# Patient Record
(demographics unavailable — no encounter records)

---

## 2025-05-09 NOTE — REASON FOR VISIT
[Follow-Up Evaluation] : a follow-up evaluation for [Cerebral Palsy] : cerebral palsy [Parents] : parents [Mother] : mother

## 2025-05-09 NOTE — PHYSICAL EXAM
[Normocephalic] : normocephalic [No dysmorphic facial features] : no dysmorphic facial features [Heart sounds regular in rate and rhythm] : heart sounds regular in rate and rhythm [Well related, good eye contact] : well related, good eye contact [Pupils reactive to light and accommodation] : pupils reactive to light and accommodation [Full extraocular movements] : full extraocular movements [Saccadic and smooth pursuits intact] : saccadic and smooth pursuits intact [No nystagmus] : no nystagmus [No facial asymmetry or weakness] : no facial asymmetry or weakness [de-identified] : abnormal curvature of the thoracic spine [de-identified] : nonverbal [de-identified] : hypotonic [de-identified] : unable to assess [de-identified] : 4 beats of ankle clonus bilaterally [de-identified] : truncal and central ataxia, very tremelous

## 2025-05-09 NOTE — PLAN
[FreeTextEntry1] : Child was referred to the ED in [facility]. I discussed his case with Dr. Seth the neurologist in site \ F/U following his hospital discharge

## 2025-05-09 NOTE — HISTORY OF PRESENT ILLNESS
[FreeTextEntry1] : Kelton is a 7 year old male with CP presenting after loss of follow up being evaluated for tremors in his legs.  The tremors come and go but has become more noticeable according to mother.  Happens only for a few seconds at a time but occurs multiple times per day.  PT at school has noticed it when he stands up for exercise.  Mother has noticed in the morning that he won't put much weight on his feet in the morning.  Patient does not normally walk.  Mother notices that he starts to get little twitches/spasms in his back.  Does not seem to cause him distress or pain as he is smiling when these episodes occur.  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    Mother denies staring spells, tonic clonic/stiffening movements.  Denies family history of anyone with tremors.  No early/premature deaths in the family.    Mother states that he is only on multivitamin and no other medications.  Developmentally, he can sit up straight but does not walk.  Does not talk.  Does have a social smile and does track objects.  Mother states he's no longer able to hold his bottle.  Receives PT/OT/Speech in school.    3/28/2024 with [facility]. She reported observing an episode of stiffening on 3/25 followed by sleep. After talking to the school staff they reported observing stiffening episodes in the past. His condition otherwise is table   3/30/2024  With [facility] to discuss the REEG results. The EEG showed bilateral background slowing, and two 4 and 7 second periods of voltage attenuation which may be c/w a seizure   5/10/2024  with the MOTHER. On Lacosamide 10mg/ML, 6MLs BID. Behavior much improved on Lacosamide. More alert, no seizures observed  8/16/2024 with the MOTHER. On Lacosamide 10mg/ML, 6MLs BID. Behavior much improved on Lacosamide. More alert, no seizures observed since last visit.    5/9/20235  with the parents. Yeison began this morning to be irritable crying with bilateral hand/legs tremors. Parents denied observing seizures recently. Now on Lacosamide 10mg/ML, 6ML BID.

## 2025-06-13 NOTE — PHYSICAL EXAM
[Normocephalic] : normocephalic [No dysmorphic facial features] : no dysmorphic facial features [Heart sounds regular in rate and rhythm] : heart sounds regular in rate and rhythm [Well related, good eye contact] : well related, good eye contact [Pupils reactive to light and accommodation] : pupils reactive to light and accommodation [Full extraocular movements] : full extraocular movements [Saccadic and smooth pursuits intact] : saccadic and smooth pursuits intact [No nystagmus] : no nystagmus [No facial asymmetry or weakness] : no facial asymmetry or weakness [de-identified] : abnormal curvature of the thoracic spine [de-identified] : nonverbal [de-identified] : hypotonic [de-identified] : unable to assess [de-identified] : 4 beats of ankle clonus bilaterally [de-identified] : truncal and central ataxia, very tremelous